# Patient Record
Sex: MALE | Race: WHITE | NOT HISPANIC OR LATINO
[De-identification: names, ages, dates, MRNs, and addresses within clinical notes are randomized per-mention and may not be internally consistent; named-entity substitution may affect disease eponyms.]

---

## 2021-03-02 ENCOUNTER — APPOINTMENT (OUTPATIENT)
Dept: PLASTIC SURGERY | Facility: CLINIC | Age: 66
End: 2021-03-02
Payer: MEDICARE

## 2021-03-02 VITALS — HEIGHT: 65 IN | WEIGHT: 160 LBS | BODY MASS INDEX: 26.66 KG/M2

## 2021-03-02 PROCEDURE — 99203 OFFICE O/P NEW LOW 30 MIN: CPT

## 2021-03-14 NOTE — REASON FOR VISIT
[Consultation] : a consultation visit [Spouse] : spouse [FreeTextEntry1] : 64 y/o male presents in office today for a consult for a cyst on his left eyebrow. Pt states the cyst has been there for a couple of years and has been growing. Pt describes the cyst as being hard and sometimes painful when there is a flare up. Over a year ago the pt saw derm for an injection but the pt does not feel that it helped. Pt states the injection made the cyst harder and larger.

## 2021-03-14 NOTE — REVIEW OF SYSTEMS
Reviewed blood work; advised additional 1000IU of vitamin D  Scheduled for surgery on wrist 11/13 with Dr. Ambar So. Discussed elevated globulin-spep negative. Advised to continue watching for symptoms. [As Noted in HPI] : as noted in HPI [Negative] : Respiratory

## 2021-03-14 NOTE — HISTORY OF PRESENT ILLNESS
[FreeTextEntry1] : 65-year-old male presents with lesion lateral left eyebrow.  Patient states that he has this lesion for multiple years has been previously treated by dermatology with injection with no resolution.  Patient presents today interested in excision of cyst

## 2021-04-08 ENCOUNTER — OUTPATIENT (OUTPATIENT)
Dept: OUTPATIENT SERVICES | Facility: HOSPITAL | Age: 66
LOS: 1 days | End: 2021-04-08
Payer: MEDICARE

## 2021-04-08 VITALS
SYSTOLIC BLOOD PRESSURE: 120 MMHG | WEIGHT: 167.99 LBS | DIASTOLIC BLOOD PRESSURE: 80 MMHG | OXYGEN SATURATION: 98 % | HEIGHT: 65 IN | TEMPERATURE: 97 F | RESPIRATION RATE: 16 BRPM | HEART RATE: 60 BPM

## 2021-04-08 DIAGNOSIS — L72.0 EPIDERMAL CYST: ICD-10-CM

## 2021-04-08 DIAGNOSIS — I10 ESSENTIAL (PRIMARY) HYPERTENSION: ICD-10-CM

## 2021-04-08 DIAGNOSIS — Z90.79 ACQUIRED ABSENCE OF OTHER GENITAL ORGAN(S): Chronic | ICD-10-CM

## 2021-04-08 DIAGNOSIS — D69.3 IMMUNE THROMBOCYTOPENIC PURPURA: ICD-10-CM

## 2021-04-08 DIAGNOSIS — L72.9 FOLLICULAR CYST OF THE SKIN AND SUBCUTANEOUS TISSUE, UNSPECIFIED: ICD-10-CM

## 2021-04-08 DIAGNOSIS — Z86.69 PERSONAL HISTORY OF OTHER DISEASES OF THE NERVOUS SYSTEM AND SENSE ORGANS: Chronic | ICD-10-CM

## 2021-04-08 DIAGNOSIS — Z85.47 PERSONAL HISTORY OF MALIGNANT NEOPLASM OF TESTIS: Chronic | ICD-10-CM

## 2021-04-08 LAB
ALBUMIN SERPL ELPH-MCNC: 4.6 G/DL — SIGNIFICANT CHANGE UP (ref 3.3–5)
ALP SERPL-CCNC: 62 U/L — SIGNIFICANT CHANGE UP (ref 40–120)
ALT FLD-CCNC: 16 U/L — SIGNIFICANT CHANGE UP (ref 4–41)
ANION GAP SERPL CALC-SCNC: 10 MMOL/L — SIGNIFICANT CHANGE UP (ref 7–14)
AST SERPL-CCNC: 23 U/L — SIGNIFICANT CHANGE UP (ref 4–40)
BILIRUB SERPL-MCNC: 0.3 MG/DL — SIGNIFICANT CHANGE UP (ref 0.2–1.2)
BUN SERPL-MCNC: 19 MG/DL — SIGNIFICANT CHANGE UP (ref 7–23)
CALCIUM SERPL-MCNC: 9.8 MG/DL — SIGNIFICANT CHANGE UP (ref 8.4–10.5)
CHLORIDE SERPL-SCNC: 101 MMOL/L — SIGNIFICANT CHANGE UP (ref 98–107)
CO2 SERPL-SCNC: 29 MMOL/L — SIGNIFICANT CHANGE UP (ref 22–31)
CREAT SERPL-MCNC: 0.89 MG/DL — SIGNIFICANT CHANGE UP (ref 0.5–1.3)
GLUCOSE SERPL-MCNC: 101 MG/DL — HIGH (ref 70–99)
HCT VFR BLD CALC: 40.3 % — SIGNIFICANT CHANGE UP (ref 39–50)
HGB BLD-MCNC: 13.2 G/DL — SIGNIFICANT CHANGE UP (ref 13–17)
MCHC RBC-ENTMCNC: 29.3 PG — SIGNIFICANT CHANGE UP (ref 27–34)
MCHC RBC-ENTMCNC: 32.8 GM/DL — SIGNIFICANT CHANGE UP (ref 32–36)
MCV RBC AUTO: 89.4 FL — SIGNIFICANT CHANGE UP (ref 80–100)
PLATELET # BLD AUTO: 72 K/UL — LOW (ref 150–400)
POTASSIUM SERPL-MCNC: 4.5 MMOL/L — SIGNIFICANT CHANGE UP (ref 3.5–5.3)
POTASSIUM SERPL-SCNC: 4.5 MMOL/L — SIGNIFICANT CHANGE UP (ref 3.5–5.3)
PROT SERPL-MCNC: 6.7 G/DL — SIGNIFICANT CHANGE UP (ref 6–8.3)
RBC # BLD: 4.51 M/UL — SIGNIFICANT CHANGE UP (ref 4.2–5.8)
RBC # FLD: 13 % — SIGNIFICANT CHANGE UP (ref 10.3–14.5)
SODIUM SERPL-SCNC: 140 MMOL/L — SIGNIFICANT CHANGE UP (ref 135–145)
WBC # BLD: 4.39 K/UL — SIGNIFICANT CHANGE UP (ref 3.8–10.5)
WBC # FLD AUTO: 4.39 K/UL — SIGNIFICANT CHANGE UP (ref 3.8–10.5)

## 2021-04-08 PROCEDURE — 93010 ELECTROCARDIOGRAM REPORT: CPT

## 2021-04-08 NOTE — H&P PST ADULT - NSICDXPROBLEM_GEN_ALL_CORE_FT
PROBLEM DIAGNOSES  Problem: Follicular cyst of the skin and subcutaneous tissue, unspecified  Assessment and Plan: Patient tentatively scheduled for excision of left eyebrow cyst on 4/19/21.  Pre-op instructions provided. Pt given verbal and written instructions with teach back on  pepcid. Pt verbalized understanding with return demonstration.   Covid testing scheduled prior to surgery as per patient.   TYRONE precautions, OR booking notified     Problem: Idiopathic thrombocytopenic purpura (ITP)  Assessment and Plan: Currently stable - Last visit with hematologist January 2021.   Last note requested     Problem: HTN (hypertension)  Assessment and Plan: Patient instructed to take olmersartan with a sip of water on the morning of procedure.

## 2021-04-08 NOTE — H&P PST ADULT - NSICDXPASTMEDICALHX_GEN_ALL_CORE_FT
PAST MEDICAL HISTORY:  Herniated lumbar intervertebral disc     HTN (hypertension)     Idiopathic thrombocytopenic purpura (ITP) Currelty stable, been followed by hematologist- last visit in Jan 2021.    Prostate cancer s/p surgery, chemo and radiation     PAST MEDICAL HISTORY:  Herniated lumbar intervertebral disc     HTN (hypertension)     Idiopathic thrombocytopenic purpura (ITP) Currelty stable, been followed by hematologist- last visit in Jan 2021.    Prostate cancer s/p surgery, chemo and radiation    Sciatic pain

## 2021-04-08 NOTE — H&P PST ADULT - HISTORY OF PRESENT ILLNESS
65 year old male presents to Presurgical testing with diagnosis of Follicular cyst of the skin and subcutaneous tissue scheduled for excision of left eyebrow cyst. Patient with c/o cyst to the left eyebrow cyst for the last few years. Patient reports that it has been hard and gets painful during flare up.

## 2021-04-08 NOTE — H&P PST ADULT - MS GEN HX ROS MEA POS PC
Patient on PT/neck pain/back pain/leg pain L/leg pain R h/o sciatic pain Patient on PT/neck pain/back pain/leg pain L/leg pain R

## 2021-04-16 ENCOUNTER — APPOINTMENT (OUTPATIENT)
Dept: DISASTER EMERGENCY | Facility: CLINIC | Age: 66
End: 2021-04-16

## 2021-04-16 DIAGNOSIS — Z01.818 ENCOUNTER FOR OTHER PREPROCEDURAL EXAMINATION: ICD-10-CM

## 2021-04-17 LAB — SARS-COV-2 N GENE NPH QL NAA+PROBE: NOT DETECTED

## 2021-04-18 ENCOUNTER — TRANSCRIPTION ENCOUNTER (OUTPATIENT)
Age: 66
End: 2021-04-18

## 2021-04-19 ENCOUNTER — APPOINTMENT (OUTPATIENT)
Dept: PLASTIC SURGERY | Facility: HOSPITAL | Age: 66
End: 2021-04-19

## 2021-04-19 ENCOUNTER — RESULT REVIEW (OUTPATIENT)
Age: 66
End: 2021-04-19

## 2021-04-19 ENCOUNTER — OUTPATIENT (OUTPATIENT)
Dept: OUTPATIENT SERVICES | Facility: HOSPITAL | Age: 66
LOS: 1 days | Discharge: HOME | End: 2021-04-19
Payer: MEDICARE

## 2021-04-19 VITALS
SYSTOLIC BLOOD PRESSURE: 120 MMHG | WEIGHT: 167.99 LBS | DIASTOLIC BLOOD PRESSURE: 80 MMHG | HEIGHT: 65 IN | TEMPERATURE: 97 F | RESPIRATION RATE: 16 BRPM | HEART RATE: 72 BPM | OXYGEN SATURATION: 100 %

## 2021-04-19 VITALS
TEMPERATURE: 98 F | OXYGEN SATURATION: 100 % | RESPIRATION RATE: 20 BRPM | HEART RATE: 65 BPM | DIASTOLIC BLOOD PRESSURE: 73 MMHG | SYSTOLIC BLOOD PRESSURE: 135 MMHG

## 2021-04-19 DIAGNOSIS — Z86.69 PERSONAL HISTORY OF OTHER DISEASES OF THE NERVOUS SYSTEM AND SENSE ORGANS: Chronic | ICD-10-CM

## 2021-04-19 DIAGNOSIS — L72.9 FOLLICULAR CYST OF THE SKIN AND SUBCUTANEOUS TISSUE, UNSPECIFIED: ICD-10-CM

## 2021-04-19 DIAGNOSIS — Z90.79 ACQUIRED ABSENCE OF OTHER GENITAL ORGAN(S): Chronic | ICD-10-CM

## 2021-04-19 PROBLEM — M54.30 SCIATICA, UNSPECIFIED SIDE: Chronic | Status: ACTIVE | Noted: 2021-04-08

## 2021-04-19 PROBLEM — D69.3 IMMUNE THROMBOCYTOPENIC PURPURA: Chronic | Status: ACTIVE | Noted: 2021-04-08

## 2021-04-19 PROBLEM — I10 ESSENTIAL (PRIMARY) HYPERTENSION: Chronic | Status: ACTIVE | Noted: 2021-04-08

## 2021-04-19 PROBLEM — C61 MALIGNANT NEOPLASM OF PROSTATE: Chronic | Status: ACTIVE | Noted: 2021-04-08

## 2021-04-19 PROBLEM — M51.26 OTHER INTERVERTEBRAL DISC DISPLACEMENT, LUMBAR REGION: Chronic | Status: ACTIVE | Noted: 2021-04-08

## 2021-04-19 PROCEDURE — 11442 EXC FACE-MM B9+MARG 1.1-2 CM: CPT

## 2021-04-19 PROCEDURE — 88304 TISSUE EXAM BY PATHOLOGIST: CPT | Mod: 26

## 2021-04-19 RX ORDER — CELECOXIB 200 MG/1
1 CAPSULE ORAL
Qty: 0 | Refills: 0 | DISCHARGE

## 2021-04-19 RX ORDER — TRAMADOL HYDROCHLORIDE 50 MG/1
1 TABLET ORAL
Qty: 0 | Refills: 0 | DISCHARGE

## 2021-04-19 RX ORDER — OLMESARTAN MEDOXOMIL 5 MG/1
2 TABLET, FILM COATED ORAL
Qty: 0 | Refills: 0 | DISCHARGE

## 2021-04-19 RX ORDER — CYCLOBENZAPRINE HYDROCHLORIDE 10 MG/1
1 TABLET, FILM COATED ORAL
Qty: 0 | Refills: 0 | DISCHARGE

## 2021-04-19 NOTE — ASU DISCHARGE PLAN (ADULT/PEDIATRIC) - CARE PROVIDER_API CALL
Christopher Bailey)  Plastic Surgery  [address] [place] [address]  Phone: [phone]  Fax: [phone]  Follow Up Time: 1 week

## 2021-04-19 NOTE — ASU DISCHARGE PLAN (ADULT/PEDIATRIC) - ASU DC SPECIAL INSTRUCTIONSFT
Please apply bacitracin ointment to the incision twice daily. It is okay to shower tomorrow, but don't scrub the incision.     Please see Dr. Bailey for follow up next week in the office.

## 2021-04-19 NOTE — BRIEF OPERATIVE NOTE - OPERATION/FINDINGS
1%Lido with Epi mixed with 0.5%Marcaine injected to site, excision of 1cm left eyebrow cyst, closed with 5-0 Vicryl and 5-0 fast. Bacitracin ointment applied to incision

## 2021-04-19 NOTE — BRIEF OPERATIVE NOTE - SPECIMENS
Left eyebrow cyst Transposition Flap Text: The defect edges were debeveled with a #15 scalpel blade.  Given the location of the defect and the proximity to free margins a transposition flap was deemed most appropriate.  Using a sterile surgical marker, an appropriate transposition flap was drawn incorporating the defect.    The area thus outlined was incised deep to adipose tissue with a #15 scalpel blade.  The skin margins were undermined to an appropriate distance in all directions utilizing iris scissors.

## 2021-04-19 NOTE — BRIEF OPERATIVE NOTE - NSICDXBRIEFPROCEDURE_GEN_ALL_CORE_FT
PROCEDURES:  Excision, lesion, benign, face, 1.1 cm to 2.0 cm in diameter 19-Apr-2021 10:57:29  Lauren Coleman Wan

## 2021-04-23 LAB — SURGICAL PATHOLOGY STUDY: SIGNIFICANT CHANGE UP

## 2021-04-25 ENCOUNTER — NON-APPOINTMENT (OUTPATIENT)
Age: 66
End: 2021-04-25

## 2021-04-27 ENCOUNTER — APPOINTMENT (OUTPATIENT)
Dept: PLASTIC SURGERY | Facility: CLINIC | Age: 66
End: 2021-04-27
Payer: MEDICARE

## 2021-04-27 PROCEDURE — 99024 POSTOP FOLLOW-UP VISIT: CPT

## 2021-05-04 ENCOUNTER — APPOINTMENT (OUTPATIENT)
Dept: PLASTIC SURGERY | Facility: CLINIC | Age: 66
End: 2021-05-04
Payer: MEDICARE

## 2021-05-04 DIAGNOSIS — L72.9 FOLLICULAR CYST OF THE SKIN AND SUBCUTANEOUS TISSUE, UNSPECIFIED: ICD-10-CM

## 2021-05-04 PROCEDURE — 99212 OFFICE O/P EST SF 10 MIN: CPT

## 2021-05-07 NOTE — REASON FOR VISIT
[Post Op: _________] : a [unfilled] post op visit [FreeTextEntry1] : 4/19/21 s/p excision of left eyebrow cyst. Pt states he is doing well with time.

## 2021-05-07 NOTE — HISTORY OF PRESENT ILLNESS
[FreeTextEntry1] : 65 year male presents for follow up status post excision of left eyebrow cyst. Patient developed erythema and scaling around incision

## 2021-05-30 PROBLEM — L72.9 CYST OF SKIN OF EYEBROW: Status: ACTIVE | Noted: 2021-03-14

## 2024-02-20 ENCOUNTER — APPOINTMENT (OUTPATIENT)
Dept: GASTROENTEROLOGY | Facility: CLINIC | Age: 69
End: 2024-02-20